# Patient Record
Sex: MALE | Employment: UNEMPLOYED | URBAN - METROPOLITAN AREA
[De-identification: names, ages, dates, MRNs, and addresses within clinical notes are randomized per-mention and may not be internally consistent; named-entity substitution may affect disease eponyms.]

---

## 2017-04-26 ENCOUNTER — ANESTHESIA (OUTPATIENT)
Dept: SURGERY | Facility: CLINIC | Age: 27
End: 2017-04-26
Payer: MEDICAID

## 2017-04-26 ENCOUNTER — HOSPITAL ENCOUNTER (OUTPATIENT)
Facility: CLINIC | Age: 27
Discharge: HOME | End: 2017-04-26
Attending: DENTIST | Admitting: DENTIST
Payer: MEDICAID

## 2017-04-26 ENCOUNTER — ANESTHESIA EVENT (OUTPATIENT)
Dept: SURGERY | Facility: CLINIC | Age: 27
End: 2017-04-26
Payer: MEDICAID

## 2017-04-26 VITALS
WEIGHT: 161.16 LBS | DIASTOLIC BLOOD PRESSURE: 58 MMHG | TEMPERATURE: 97.8 F | RESPIRATION RATE: 20 BRPM | SYSTOLIC BLOOD PRESSURE: 96 MMHG | OXYGEN SATURATION: 95 % | BODY MASS INDEX: 31.64 KG/M2 | HEIGHT: 60 IN

## 2017-04-26 PROCEDURE — 25000125 ZZHC RX 250: Performed by: DENTIST

## 2017-04-26 PROCEDURE — 40000170 ZZH STATISTIC PRE-PROCEDURE ASSESSMENT II: Performed by: DENTIST

## 2017-04-26 PROCEDURE — 27210794 ZZH OR GENERAL SUPPLY STERILE: Performed by: DENTIST

## 2017-04-26 PROCEDURE — 36000051 ZZH SURGERY LEVEL 2 1ST 30 MIN - UMMC: Performed by: DENTIST

## 2017-04-26 PROCEDURE — 25000128 H RX IP 250 OP 636: Performed by: NURSE ANESTHETIST, CERTIFIED REGISTERED

## 2017-04-26 PROCEDURE — 36000053 ZZH SURGERY LEVEL 2 EA 15 ADDTL MIN - UMMC: Performed by: DENTIST

## 2017-04-26 PROCEDURE — 37000009 ZZH ANESTHESIA TECHNICAL FEE, EACH ADDTL 15 MIN: Performed by: DENTIST

## 2017-04-26 PROCEDURE — 25000566 ZZH SEVOFLURANE, EA 15 MIN: Performed by: DENTIST

## 2017-04-26 PROCEDURE — 25000125 ZZHC RX 250: Performed by: REGISTERED NURSE

## 2017-04-26 PROCEDURE — 71000027 ZZH RECOVERY PHASE 2 EACH 15 MINS: Performed by: DENTIST

## 2017-04-26 PROCEDURE — 25000128 H RX IP 250 OP 636: Performed by: REGISTERED NURSE

## 2017-04-26 PROCEDURE — 25800025 ZZH RX 258: Performed by: REGISTERED NURSE

## 2017-04-26 PROCEDURE — 25000125 ZZHC RX 250: Performed by: NURSE ANESTHETIST, CERTIFIED REGISTERED

## 2017-04-26 PROCEDURE — 37000008 ZZH ANESTHESIA TECHNICAL FEE, 1ST 30 MIN: Performed by: DENTIST

## 2017-04-26 PROCEDURE — 71000014 ZZH RECOVERY PHASE 1 LEVEL 2 FIRST HR: Performed by: DENTIST

## 2017-04-26 PROCEDURE — 25000132 ZZH RX MED GY IP 250 OP 250 PS 637: Performed by: REGISTERED NURSE

## 2017-04-26 PROCEDURE — 25000132 ZZH RX MED GY IP 250 OP 250 PS 637: Performed by: DENTIST

## 2017-04-26 RX ORDER — MEPERIDINE HYDROCHLORIDE 25 MG/ML
12.5 INJECTION INTRAMUSCULAR; INTRAVENOUS; SUBCUTANEOUS
Status: DISCONTINUED | OUTPATIENT
Start: 2017-04-26 | End: 2017-04-26 | Stop reason: HOSPADM

## 2017-04-26 RX ORDER — DEXAMETHASONE SODIUM PHOSPHATE 4 MG/ML
INJECTION, SOLUTION INTRA-ARTICULAR; INTRALESIONAL; INTRAMUSCULAR; INTRAVENOUS; SOFT TISSUE PRN
Status: DISCONTINUED | OUTPATIENT
Start: 2017-04-26 | End: 2017-04-26

## 2017-04-26 RX ORDER — NALOXONE HYDROCHLORIDE 0.4 MG/ML
.1-.4 INJECTION, SOLUTION INTRAMUSCULAR; INTRAVENOUS; SUBCUTANEOUS
Status: DISCONTINUED | OUTPATIENT
Start: 2017-04-26 | End: 2017-04-26 | Stop reason: HOSPADM

## 2017-04-26 RX ORDER — CEFAZOLIN SODIUM 1 G/3ML
1 INJECTION, POWDER, FOR SOLUTION INTRAMUSCULAR; INTRAVENOUS SEE ADMIN INSTRUCTIONS
Status: DISCONTINUED | OUTPATIENT
Start: 2017-04-26 | End: 2017-04-26 | Stop reason: HOSPADM

## 2017-04-26 RX ORDER — FENTANYL CITRATE 50 UG/ML
25-50 INJECTION, SOLUTION INTRAMUSCULAR; INTRAVENOUS
Status: DISCONTINUED | OUTPATIENT
Start: 2017-04-26 | End: 2017-04-26 | Stop reason: HOSPADM

## 2017-04-26 RX ORDER — CEFAZOLIN SODIUM 2 G/100ML
2 INJECTION, SOLUTION INTRAVENOUS
Status: DISCONTINUED | OUTPATIENT
Start: 2017-04-26 | End: 2017-04-26 | Stop reason: HOSPADM

## 2017-04-26 RX ORDER — SODIUM CHLORIDE, SODIUM LACTATE, POTASSIUM CHLORIDE, CALCIUM CHLORIDE 600; 310; 30; 20 MG/100ML; MG/100ML; MG/100ML; MG/100ML
INJECTION, SOLUTION INTRAVENOUS CONTINUOUS
Status: DISCONTINUED | OUTPATIENT
Start: 2017-04-26 | End: 2017-04-26 | Stop reason: HOSPADM

## 2017-04-26 RX ORDER — ONDANSETRON 2 MG/ML
4 INJECTION INTRAMUSCULAR; INTRAVENOUS EVERY 30 MIN PRN
Status: DISCONTINUED | OUTPATIENT
Start: 2017-04-26 | End: 2017-04-26 | Stop reason: HOSPADM

## 2017-04-26 RX ORDER — ONDANSETRON 4 MG/1
4 TABLET, ORALLY DISINTEGRATING ORAL EVERY 30 MIN PRN
Status: DISCONTINUED | OUTPATIENT
Start: 2017-04-26 | End: 2017-04-26 | Stop reason: HOSPADM

## 2017-04-26 RX ORDER — NEOSTIGMINE METHYLSULFATE 1 MG/ML
VIAL (ML) INJECTION PRN
Status: DISCONTINUED | OUTPATIENT
Start: 2017-04-26 | End: 2017-04-26

## 2017-04-26 RX ORDER — CHLORHEXIDINE GLUCONATE ORAL RINSE 1.2 MG/ML
10 SOLUTION DENTAL ONCE
Status: DISCONTINUED | OUTPATIENT
Start: 2017-04-26 | End: 2017-04-26 | Stop reason: HOSPADM

## 2017-04-26 RX ORDER — ONDANSETRON 2 MG/ML
INJECTION INTRAMUSCULAR; INTRAVENOUS PRN
Status: DISCONTINUED | OUTPATIENT
Start: 2017-04-26 | End: 2017-04-26

## 2017-04-26 RX ORDER — PROPOFOL 10 MG/ML
INJECTION, EMULSION INTRAVENOUS PRN
Status: DISCONTINUED | OUTPATIENT
Start: 2017-04-26 | End: 2017-04-26

## 2017-04-26 RX ORDER — BACITRACIN ZINC 500 [USP'U]/G
OINTMENT TOPICAL PRN
Status: DISCONTINUED | OUTPATIENT
Start: 2017-04-26 | End: 2017-04-26 | Stop reason: HOSPADM

## 2017-04-26 RX ORDER — SODIUM CHLORIDE, SODIUM LACTATE, POTASSIUM CHLORIDE, CALCIUM CHLORIDE 600; 310; 30; 20 MG/100ML; MG/100ML; MG/100ML; MG/100ML
INJECTION, SOLUTION INTRAVENOUS CONTINUOUS PRN
Status: DISCONTINUED | OUTPATIENT
Start: 2017-04-26 | End: 2017-04-26

## 2017-04-26 RX ORDER — FENTANYL CITRATE 50 UG/ML
INJECTION, SOLUTION INTRAMUSCULAR; INTRAVENOUS PRN
Status: DISCONTINUED | OUTPATIENT
Start: 2017-04-26 | End: 2017-04-26

## 2017-04-26 RX ORDER — KETOROLAC TROMETHAMINE 30 MG/ML
INJECTION, SOLUTION INTRAMUSCULAR; INTRAVENOUS PRN
Status: DISCONTINUED | OUTPATIENT
Start: 2017-04-26 | End: 2017-04-26

## 2017-04-26 RX ORDER — LIDOCAINE HYDROCHLORIDE 20 MG/ML
INJECTION, SOLUTION INFILTRATION; PERINEURAL PRN
Status: DISCONTINUED | OUTPATIENT
Start: 2017-04-26 | End: 2017-04-26

## 2017-04-26 RX ORDER — OXYMETAZOLINE HYDROCHLORIDE 0.05 G/100ML
SPRAY NASAL PRN
Status: DISCONTINUED | OUTPATIENT
Start: 2017-04-26 | End: 2017-04-26

## 2017-04-26 RX ORDER — CHLORHEXIDINE GLUCONATE ORAL RINSE 1.2 MG/ML
SOLUTION DENTAL PRN
Status: DISCONTINUED | OUTPATIENT
Start: 2017-04-26 | End: 2017-04-26 | Stop reason: HOSPADM

## 2017-04-26 RX ORDER — GLYCOPYRROLATE 0.2 MG/ML
INJECTION, SOLUTION INTRAMUSCULAR; INTRAVENOUS PRN
Status: DISCONTINUED | OUTPATIENT
Start: 2017-04-26 | End: 2017-04-26

## 2017-04-26 RX ADMIN — MIDAZOLAM HYDROCHLORIDE 1 MG: 1 INJECTION, SOLUTION INTRAMUSCULAR; INTRAVENOUS at 12:41

## 2017-04-26 RX ADMIN — PHENYLEPHRINE HYDROCHLORIDE 50 MCG: 10 INJECTION, SOLUTION INTRAMUSCULAR; INTRAVENOUS; SUBCUTANEOUS at 13:52

## 2017-04-26 RX ADMIN — DEXMEDETOMIDINE HYDROCHLORIDE 10 MCG: 100 INJECTION, SOLUTION INTRAVENOUS at 14:03

## 2017-04-26 RX ADMIN — Medication 2 SPRAY: at 12:48

## 2017-04-26 RX ADMIN — SODIUM CHLORIDE, POTASSIUM CHLORIDE, SODIUM LACTATE AND CALCIUM CHLORIDE: 600; 310; 30; 20 INJECTION, SOLUTION INTRAVENOUS at 14:30

## 2017-04-26 RX ADMIN — Medication 10 MG: at 13:35

## 2017-04-26 RX ADMIN — FENTANYL CITRATE 50 MCG: 50 INJECTION, SOLUTION INTRAMUSCULAR; INTRAVENOUS at 12:46

## 2017-04-26 RX ADMIN — DEXAMETHASONE SODIUM PHOSPHATE 8 MG: 4 INJECTION, SOLUTION INTRAMUSCULAR; INTRAVENOUS at 13:23

## 2017-04-26 RX ADMIN — NEOSTIGMINE METHYLSULFATE 2 MG: 1 INJECTION INTRAMUSCULAR; INTRAVENOUS; SUBCUTANEOUS at 14:04

## 2017-04-26 RX ADMIN — PROPOFOL 80 MG: 10 INJECTION, EMULSION INTRAVENOUS at 13:35

## 2017-04-26 RX ADMIN — LIDOCAINE HYDROCHLORIDE 60 MG: 20 INJECTION, SOLUTION INFILTRATION; PERINEURAL at 12:46

## 2017-04-26 RX ADMIN — GLYCOPYRROLATE 0.4 MG: 0.2 INJECTION, SOLUTION INTRAMUSCULAR; INTRAVENOUS at 14:04

## 2017-04-26 RX ADMIN — DEXMEDETOMIDINE HYDROCHLORIDE 10 MCG: 100 INJECTION, SOLUTION INTRAVENOUS at 14:11

## 2017-04-26 RX ADMIN — NEOSTIGMINE METHYLSULFATE 2 MG: 1 INJECTION INTRAMUSCULAR; INTRAVENOUS; SUBCUTANEOUS at 14:10

## 2017-04-26 RX ADMIN — PHENYLEPHRINE HYDROCHLORIDE 50 MCG: 10 INJECTION, SOLUTION INTRAMUSCULAR; INTRAVENOUS; SUBCUTANEOUS at 13:54

## 2017-04-26 RX ADMIN — PROPOFOL 120 MG: 10 INJECTION, EMULSION INTRAVENOUS at 12:46

## 2017-04-26 RX ADMIN — ONDANSETRON 4 MG: 2 INJECTION INTRAMUSCULAR; INTRAVENOUS at 14:10

## 2017-04-26 RX ADMIN — KETOROLAC TROMETHAMINE 30 MG: 30 INJECTION, SOLUTION INTRAMUSCULAR at 14:12

## 2017-04-26 RX ADMIN — SODIUM CHLORIDE, POTASSIUM CHLORIDE, SODIUM LACTATE AND CALCIUM CHLORIDE: 600; 310; 30; 20 INJECTION, SOLUTION INTRAVENOUS at 12:41

## 2017-04-26 RX ADMIN — FENTANYL CITRATE 50 MCG: 50 INJECTION, SOLUTION INTRAMUSCULAR; INTRAVENOUS at 13:35

## 2017-04-26 RX ADMIN — Medication 40 MG: at 12:46

## 2017-04-26 NOTE — ANESTHESIA CARE TRANSFER NOTE
Patient: William Mckeon    Procedure(s):  Dental Exam, Radiographs, Extractions x1, Periodontal Therapy, Flouride Varnish - Wound Class: II-Clean Contaminated    Diagnosis: Dental Caries, Periodontal Disease   Diagnosis Additional Information: No value filed.    Anesthesia Type:   General, ETT     Note:  Airway :Face Mask  Patient transferred to:PACU  Comments: Transfer to PACU for recovery.  Monitors placed.  VSS noted.  Report to RN.        Vitals: (Last set prior to Anesthesia Care Transfer)    CRNA VITALS  4/26/2017 1351 - 4/26/2017 1429      4/26/2017             Pulse: 100    Ht Rate: 100    SpO2: 97 %                Electronically Signed By: VALERIE HURST CRNA  April 26, 2017  2:29 PM

## 2017-04-26 NOTE — OR NURSING
Patient expressed that he needed to void. Taken by wheelchair to bathroom, voided in toilet. Patient remains very lethargic. Requiring frequent stimulation to keep eyes open. Oxygen saturations fluctuating 88-96%. Have kept patient up in wheelchair to promote resp efforts. Mother offering child fluids and applesauce, will continue to monitor. Reviewed with mother that he will not yolanda discharged until he stays awake and maintains oxygen saturations 92% or >. Mom verbalizing understanding of plan of care.

## 2017-04-26 NOTE — ANESTHESIA POSTPROCEDURE EVALUATION
Patient: William Mckeon    Procedure(s):  Dental Exam, Radiographs, Extractions x1, Periodontal Therapy, Flouride Varnish - Wound Class: II-Clean Contaminated    Diagnosis:Dental Caries, Periodontal Disease   Diagnosis Additional Information: No value filed.    Anesthesia Type:  General, ETT    Note:  Anesthesia Post Evaluation    Patient location during evaluation: PACU and Bedside  Patient participation: Able to fully participate in evaluation  Level of consciousness: awake and alert  Pain management: adequate  Airway patency: patent  Cardiovascular status: acceptable  Respiratory status: acceptable  Hydration status: balanced  PONV: none     Anesthetic complications: None          Last vitals:  Vitals:    04/26/17 1424 04/26/17 1430 04/26/17 1445   BP: 124/67 116/62 113/54   Resp: 16     Temp: 36.3  C (97.3  F)     SpO2: 100% 98% 97%         Electronically Signed By: Vandana Stewart MD  April 26, 2017  2:54 PM

## 2017-04-26 NOTE — DISCHARGE INSTRUCTIONS
Same-Day Surgery   Adult Discharge Orders & Instructions     For 24 hours after surgery:  1. Get plenty of rest.  A responsible adult must stay with you for at least 24 hours after you leave the hospital.   2. Pain medication can slow your reflexes. Do not drive or use heavy equipment.  If you have weakness or tingling, don't drive or use heavy equipment until this feeling goes away.  3. Mixing alcohol and pain medication can cause dizziness and slow your breathing. It can even be fatal. Do not drink alcohol while taking pain medication.  4. Avoid strenuous or risky activities.  Ask for help when climbing stairs.   5. You may feel lightheaded.  If so, sit for a few minutes before standing.  Have someone help you get up.   6. If you have nausea (feel sick to your stomach), drink only clear liquids such as apple juice, ginger ale, broth or 7-Up.  Rest may also help.  Be sure to drink enough fluids.  Move to a regular diet as you feel able. Take pain medications with a small amount of solid food, such as toast or crackers, to avoid nausea.   7. A slight fever is normal. Call the doctor if your fever is over 100 F (37.7 C) (taken under the tongue) or lasts longer than 24 hours.  8. You may have a dry mouth, muscle aches, trouble sleeping or a sore throat.  These symptoms should go away after 24 hours.  9. Do not make important or legal decisions.   Pain Management:      1. Take pain medication (if prescribed) for pain as directed by your physician.        2. WARNING: If the pain medication you have been prescribed contains Tylenol  (acetaminophen), DO NOT take additional doses of Tylenol (acetaminophen).     Call your doctor for any of the followin.  Signs of infection (fever, growing tenderness at the surgery site, severe pain, a large amount of drainage or bleeding, foul-smelling drainage, redness, swelling).    2.  It has been over 8 to 10 hours since surgery and you are still not able to urinate (pee).    3.   Headache for over 24 hours.    4.  Numbness, tingling or weakness the day after surgery (if you had spinal anesthesia).  To contact a doctor, call ___________DR Zazueta__________________________ or:      [phone] and ask for the Resident On Call for:          __________________Dentistry________________________ (answered 24 hours a day)      Emergency Department:  [facility]: [phone]  [place] [facility]: [phone]               Rev. 10/2014

## 2017-04-26 NOTE — IP AVS SNAPSHOT
MAIN OR    [address]    [place] [address]    Phone:  [phone]                                       After Visit Summary   4/26/2017    William Mckeon    MRN: [id number]           After Visit Summary Signature Page     I have received my discharge instructions, and my questions have been answered. I have discussed any challenges I see with this plan with the nurse or doctor.    ..........................................................................................................................................  Patient/Patient Representative Signature      ..........................................................................................................................................  Patient Representative Print Name and Relationship to Patient    ..................................................               ................................................  Date                                            Time    ..........................................................................................................................................  Reviewed by Signature/Title    ...................................................              ..............................................  Date                                                            Time

## 2017-04-26 NOTE — IP AVS SNAPSHOT
MRN:[id number]                      After Visit Summary   4/26/2017    William Mckeon    MRN: [id number]           Thank you!     Thank you for choosing [facility] for your care. Our goal is always to provide you with excellent care. Hearing back from our patients is one way we can continue to improve our services. Please take a few minutes to complete the written survey that you may receive in the mail after you visit with us. Thank you!        Patient Information     Date Of Birth          1990        About your hospital stay     You were admitted on:  April 26, 2017 You last received care in the:  [facility] OR    You were discharged on:  April 26, 2017       Who to Call     For medical emergencies, please call 911.  For non-urgent questions about your medical care, please call your primary care provider or clinic, [phone]  For questions related to your surgery, please call your surgery clinic        Attending Provider     Provider Uyen Rosas DDS Dentist       Primary Care Provider Office Phone # Fax #    Annetta Saavedra [phone]       [address] [address]        After Care Instructions     Discharge Instructions       Return to [facility] dental clinic in 6 months for recall and follow up.  Call the dental clinic to schedule the appointment.  Clinic phone: [phone]  Emergency post op care (after business hours and weekends) call: [phone], and ask for the dental resident on call.    Procedures Performed Today (April 26, 2017)  Dental exam, dental x-rays, cleaning, dental extraction of #T, fluoride varnish.     Post-operative oral surgery instructions  Care of the mouth following a surgical procedure is essential in the healing process.  There is a certain amount of swelling, discoloration, discomfort and bleeding which can be expected.     Swelling:  Some degree of swelling is normal and can be  minimized with the use of ice or cold packs applied to the area for 15-20 minutes and then removed for 15-20 minutes.  This should only be done for the first 24 hours, after 24 hours use a warm moist compress over the area for 15-20 minutes and then remove for 15-20 minutes.  Sit upright and keep your head elevated when sleeping.  Maximum swelling will occur about the second or third post-operative day and then slowly recede. Once present, it can remain swollen for up to 7 days and discomfort may persist for 10 days.    Discomfort:   A variable amount of pain follows extraction and oral surgery procedures. Tylenol, ibuprofen, or any over the counter pain medication can be used. In some cases, prescription pain medication will be given which  should be taken exactly as directed, and taken before the local anesthetic wears off. If pain increases for more than 3 days call the clinic.   Do not take pain medication on an empty stomach as it may cause nausea.     Bleeding:  Some bleeding and oozing is to be expected for several hours.  Avoid spitting, rinsing, swishing, and use of a straw for the next 24-48 hours, as they may provoke oozing.  If bleeding is visible then place a moistened gauze pack over the area and keep firm pressure on the gauze pack for 30 minutes and then discard.  If bleeding is more than slight, use sterile gauze or a moistened tea bag over the area and again apply firm pressure for 30 minutes.     Discoloration:   Facial discoloration (black and blue bruising) often follows many extractions and oral surgery procedures. Discoloration is normal and is no cause for alarm. It may persist for as long as several weeks.    Jaw Stiffness:   For several days following most oral surgical procedures, the jaw may become somewhat stiff. Should jaw stiffness worsen after 2  weeks, call the clinic.    Numbness:    Local anesthetics may be effective for approximately 24-48 hours. If you are numb beyond this time  frame, please call the clinic.    Nausea:    Nausea is common after surgery, and it is sometimes caused by pain medicines. Nausea may be reduced by preceding each pill with a small amount of soft food, then taking the pill with a large volume of water. Continue consuming clear fluids and minimize the pain medication. Call if you don t feel better or if vomiting is a problem. Soft drinks that have less carbonation may help with nausea.    Care of the mouth:  Do not rinse your mouth for 24 hours after surgery.  A day following surgery or extraction rinse with warm salt water after each meal or 3-4 times a day (One half teaspoonful of table salt in a full glass of warm water). Resume normal oral hygiene within 24 hours after procedure. Do not brush surgical or extraction site but clean your teeth within the bounds of comfort.   Avoid use of alcohol, smoking, or carbonated drinks for 24-48 hours after surgery.  This may interfere with clot formation and slow the healing process.  Smoking is the primary cause of dry sockets.    Dry Socket:   This is a condition where the wound healing is disturbed or altered after removal of a tooth. This usually occurs within 3-10 days after removal of a tooth. Typically pain will increase and worsen, and may radiate along the jaw and into the ear. Call the clinic if you experience these symptoms.    Diet:  A soft or liquid diet is recommended for the first few days following surgery, advance as tolerated. Until local anesthesia (numbness) wears off, be careful chewing to prevent biting the numb area. Eat soft and liquid foods such as yogurt, cottage cheese, soup etc. Try not to skip a meal, and keep up normal diet.    Rest:  Rest as much as possible for the next 24 hours, avoiding any excessive amount of physical activity.     Fluoride Varnish:  A 5% sodium fluoride varnish was placed over the teeth for the prevention of dental decay.  The varnish hardens on contact with saliva so the  teeth may appear spotty or as if there is a thin film coating the teeth, this is normal.  The varnish should remain on your teeth for at least 4-6 hours for the maximum effect.  It is recommended that you only eat soft foods and drink cold liquids during this time, do not eat or drink anything hot and do not brush your teeth the day of your surgery.  The varnish with naturally wear away, and can be brushed off the next day.     William may return to regular daily activities in 48-72 hours.                  Further instructions from your care team       Same-Day Surgery   Adult Discharge Orders & Instructions     For 24 hours after surgery:  1. Get plenty of rest.  A responsible adult must stay with you for at least 24 hours after you leave the hospital.   2. Pain medication can slow your reflexes. Do not drive or use heavy equipment.  If you have weakness or tingling, don't drive or use heavy equipment until this feeling goes away.  3. Mixing alcohol and pain medication can cause dizziness and slow your breathing. It can even be fatal. Do not drink alcohol while taking pain medication.  4. Avoid strenuous or risky activities.  Ask for help when climbing stairs.   5. You may feel lightheaded.  If so, sit for a few minutes before standing.  Have someone help you get up.   6. If you have nausea (feel sick to your stomach), drink only clear liquids such as apple juice, ginger ale, broth or 7-Up.  Rest may also help.  Be sure to drink enough fluids.  Move to a regular diet as you feel able. Take pain medications with a small amount of solid food, such as toast or crackers, to avoid nausea.   7. A slight fever is normal. Call the doctor if your fever is over 100 F (37.7 C) (taken under the tongue) or lasts longer than 24 hours.  8. You may have a dry mouth, muscle aches, trouble sleeping or a sore throat.  These symptoms should go away after 24 hours.  9. Do not make important or legal decisions.   Pain Management:      1.  "Take pain medication (if prescribed) for pain as directed by your physician.        2. WARNING: If the pain medication you have been prescribed contains Tylenol  (acetaminophen), DO NOT take additional doses of Tylenol (acetaminophen).     Call your doctor for any of the followin.  Signs of infection (fever, growing tenderness at the surgery site, severe pain, a large amount of drainage or bleeding, foul-smelling drainage, redness, swelling).    2.  It has been over 8 to 10 hours since surgery and you are still not able to urinate (pee).    3.  Headache for over 24 hours.    4.  Numbness, tingling or weakness the day after surgery (if you had spinal anesthesia).  To contact a doctor, call ___________DR Zazueta__________________________ or:      [phone] and ask for the Resident On Call for:          __________________Dentistry________________________ (answered 24 hours a day)      Emergency Department:  [place]: [phone]  [facility] [facility]: [phone]               Rev. 10/2014       Pending Results     No orders found from 2017 to 2017.            Admission Information     Date & Time Provider Department Dept. Phone    2017 Uyen Zazueta DDS [facility] MAIN [phone]      Your Vitals Were     Blood Pressure Temperature Respirations Height Weight Pulse Oximetry    117/87 97.9  F (36.6  C) (Oral) 16 1.448 m (4' 9\") 73.1 kg (161 lb 2.5 oz) 98%    BMI (Body Mass Index)                   34.87 kg/m2           amprice Information     amprice lets you send messages to your doctor, view your test results, renew your prescriptions, schedule appointments and more. To sign up, go to [email or web address] . Click on \"Log in\" on the left side of the screen, which will take you to the Welcome page. Then click on \"Sign up Now\" on the right side of the page.     You will be asked to enter the access code listed below, as well as some personal " information. Please follow the directions to create your username and password.     Your access code is: [email or web address]  Expires: 2017  2:17 PM     Your access code will  in 90 days. If you need help or a new code, please call your [place] clinic or [phone].        Care EveryWhere ID     This is your Care EveryWhere ID. This could be used by other organizations to access your [place] medical records  [id number]           Review of your medicines      CONTINUE these medicines which have NOT CHANGED        Dose / Directions    etanercept 50 MG/ML injection   Commonly known as:  ENBREL        Refills:  0       UNABLE TO FIND        MEDICATION NAME: vitamin   Refills:  0                Protect others around you: Learn how to safely use, store and throw away your medicines at www.disposemymeds.org.             Medication List: This is a list of all your medications and when to take them. Check marks below indicate your daily home schedule. Keep this list as a reference.      Medications           Morning Afternoon Evening Bedtime As Needed    etanercept 50 MG/ML injection   Commonly known as:  ENBREL                                UNABLE TO FIND   MEDICATION NAME: vitamin

## 2017-04-26 NOTE — ANESTHESIA PREPROCEDURE EVALUATION
Anesthesia Plan      History & Physical Review  History and physical reviewed and following examination; no interval change.    ASA Status:  3 .    NPO Status:  > 8 hours    Plan for General and ETT with Intravenous and Propofol induction. Maintenance will be Inhalation.    PONV prophylaxis:  Ondansetron (or other 5HT-3)  Additional equipment: Videolaryngoscope      Postoperative Care  Postoperative pain management:  IV analgesics and Multi-modal analgesia.      Consents  Anesthetic plan, risks, benefits and alternatives discussed with:  Parent (Mother and/or Father)..                          .

## 2017-04-26 NOTE — BRIEF OP NOTE
[facility], [place]    Brief Operative Note    Pre-operative diagnosis: Dental Caries, Periodontal Disease   Post-operative diagnosis #T extensive decay, abscess, gingivitis  Procedure: Procedure(s):  Dental Exam, Radiographs, Extractions x1, Periodontal Therapy, Flouride Varnish - Wound Class: II-Clean Contaminated  Surgeon: Surgeon(s) and Role:     * Uyen Zazueta DDS - Primary     * Neelam Dunham DDS - Resident - Assisting  Anesthesia: General   Estimated blood loss: Less than 10 ml  Drains: None  Specimens: * No specimens in log *  Findings:   None.  Complications: None.  Implants: None.    Pt was extubated in OR.

## 2017-04-26 NOTE — OR NURSING
Report given, care transferred at bedside to Natalie Marshall RN. Plan of care reviewed with mother.

## 2017-04-26 NOTE — OR NURSING
Remains very difficult to keep awake. Placed back on oxygen, 3L for vepifylxonm45-35% on room air. Tolerated fluids.  Will continue to monitor until fully awake and able to discharge.

## 2017-04-26 NOTE — OR NURSING
Report received, care transferred to this RN at bedside from Neelam Lees RN. Patient is sitting up in bed, mother at bedside. Taking sips of fluids, swallowing well, no bleeding noted.

## 2017-04-27 NOTE — OP NOTE
DATE OF SURGERY:  04/26/2017.      It was deemed necessary for this patient to be seen in the hospital operating room due to Down syndrome and inability to be treated in a traditional dental clinic setting.      PROCEDURES:  Under general anesthesia, the following operations were performed in the mouth:   1.  Bilateral dental examination.   2.  Dental radiographs.   3.  Prophylaxis and periodontal therapy.   4.  Dental extractions x1.   5.  Fluoride varnish application.      ATTENDING PHYSICIAN:  Uyen Zazueta DDS      FIRST ASSISTANT DENTAL RESIDENT:  Neelam Dunham DMD      ANESTHESIOLOGIST:  Franklin MD      SCRUB NURSE:  Siomara Montez.      CIRCULATING NURSE:  Celeste Morton.       CRNA:  Siomara Hanley CRNA      PREOPERATIVE DIAGNOSES:  Suspected periodontal disease and dental caries.      POSTOPERATIVE DIAGNOSES:   1.  Gingival hyperplasia.   2.  Chronic generalized gingivitis and early periodontitis.   3.  Enamel hypoplasia   4.  Dental caries.   5.  One nonrestorable tooth.      DESCRIPTION OF PROCEDURE:  William Mckeon was brought into the operating room and draped in the usual customary [facility] fashion.  Following the timeout procedures, general anesthesia was administered through the patient's right naris.  A bilateral dental exam was performed and a series of 1 periapical and 4 bitewing radiographs were obtained and interpreted.  A moist throat pack was placed at 13:35.  Clinical examination revealed 1 grossly decayed tooth, generalized heavy plaque, periodontal pockets ranging from 3-5 mm, and generalized xerostomia.  Radiographic examination revealed normal bone trabeculation, missing tooth numbers 1, 4, 16, 17, 20, 29 and 32.  One coronal radiolucency consistent with dental caries on tooth number T and periapical radiolucency under tooth number T as well as retained primary teeth numbers A and T.  The local anesthetic used was 2% lidocaine with  1:100,000 epinephrine.  The total amount dispersed was 1 mL.  The following procedures were performed:  Periodontal therapy was performed on all teeth using ultrasonic debridement, supragingival scaling and root planing, rubber cup polishing and flossing.  Nonsurgical extractions were performed on the mandibular right primary second molar, number T.  Gauze hemostasis was achieved.  Fluoride varnish was applied to all teeth.  The throat pack was then removed with suction at 14:05.  The oropharynx was inspected and found to be clear.  Estimated blood loss was 1 mL.      The attending doctor, Dr. Zazueta, was present for the entire procedure.  The patient was extubated in the operating room and taken to the postanesthesia care unit in good condition.         ELKIN ZAZUETA DDS       As dictated by LEVI GORE DMD            D: 2017 15:14   T: 2017 06:10   MT: cedric      Name:     KARLEY VALENTIN   MRN:      [id number]        Account:        [id number]   :      1990           Procedure Date: 2017      Document: [id number]

## 2021-10-13 DIAGNOSIS — Z11.59 ENCOUNTER FOR SCREENING FOR OTHER VIRAL DISEASES: ICD-10-CM

## 2021-11-01 RX ORDER — BETAMETHASONE DIPROPIONATE 0.5 MG/ML
LOTION, AUGMENTED TOPICAL
COMMUNITY
Start: 2021-04-15

## 2021-11-01 RX ORDER — ERGOCALCIFEROL 1.25 MG/1
50000 CAPSULE ORAL
COMMUNITY
Start: 2021-06-07

## 2021-11-01 RX ORDER — CLOBETASOL PROPIONATE 0.5 MG/ML
SOLUTION TOPICAL
COMMUNITY
Start: 2021-04-15

## 2021-11-01 RX ORDER — CLINDAMYCIN PHOSPHATE 10 MG/ML
1 SOLUTION TOPICAL
COMMUNITY
Start: 2021-05-19

## 2021-11-01 NOTE — OR NURSING
Secure email sent to [employer] & PreOp manager team:    From: Camila Townsend   Sent: Monday, November 1, 2021 5:12 PM  To: Shanique Moreno <[email or web address]>; Pamela Gonsalez <[email or web address]>; Suly Ruiz <[email or web address]>  Cc: Siomara Haskins <[email or web address]>  Subject: Confidential: Guardianship & Accommodations  Importance: High    Dear [employer] & PreOp manager team,    I just completed the PreOp phone call for pt William Mckeon MR#[id number], of note: Mom is Guardian - paperwork is on file under Chart Review > Media tab in EPIC, however there is not the usual flag in the chart regarding consent nor any listing under the Code/ACD tab. Shanique Memento, would you please review to see if anything additional needs to be placed in the chart? I did put in a brief note in demographics regarding contact/caretaking.    Additionally, PreOp team - pt is non-verbal, nervous about procedures. Mom would appreciate escort to PreOp and soft music for patient to help keep him calm/reduce anxiety. Mom Zulma is his caretaker and would like to be with him as much as possible pre- & post-op.     Thank you for taking the time to review,    Pearl Townsend, RN, BSN, MPH  PreAdmission Nursing  [facility]

## 2021-11-02 ENCOUNTER — DOCUMENTATION ONLY (OUTPATIENT)
Dept: OTHER | Facility: CLINIC | Age: 31
End: 2021-11-02

## 2021-11-02 ENCOUNTER — ANESTHESIA EVENT (OUTPATIENT)
Dept: SURGERY | Facility: CLINIC | Age: 31
End: 2021-11-02
Payer: MEDICARE

## 2021-11-03 ENCOUNTER — HOSPITAL ENCOUNTER (OUTPATIENT)
Facility: CLINIC | Age: 31
Discharge: HOME | End: 2021-11-03
Attending: DENTIST | Admitting: DENTIST
Payer: MEDICARE

## 2021-11-03 ENCOUNTER — ANESTHESIA (OUTPATIENT)
Dept: SURGERY | Facility: CLINIC | Age: 31
End: 2021-11-03
Payer: MEDICARE

## 2021-11-03 VITALS
SYSTOLIC BLOOD PRESSURE: 99 MMHG | RESPIRATION RATE: 16 BRPM | OXYGEN SATURATION: 96 % | HEART RATE: 78 BPM | TEMPERATURE: 97.5 F | HEIGHT: 60 IN | BODY MASS INDEX: 32.68 KG/M2 | WEIGHT: 166.45 LBS | DIASTOLIC BLOOD PRESSURE: 63 MMHG

## 2021-11-03 LAB — GLUCOSE BLDC GLUCOMTR-MCNC: 93 MG/DL (ref 70–99)

## 2021-11-03 PROCEDURE — 250N000009 HC RX 250: Performed by: NURSE ANESTHETIST, CERTIFIED REGISTERED

## 2021-11-03 PROCEDURE — 250N000013 HC RX MED GY IP 250 OP 250 PS 637: Performed by: ANESTHESIOLOGY

## 2021-11-03 PROCEDURE — 82962 GLUCOSE BLOOD TEST: CPT | Mod: GZ

## 2021-11-03 PROCEDURE — 710N000012 HC RECOVERY PHASE 2, PER MINUTE: Performed by: DENTIST

## 2021-11-03 PROCEDURE — 258N000003 HC RX IP 258 OP 636: Performed by: NURSE ANESTHETIST, CERTIFIED REGISTERED

## 2021-11-03 PROCEDURE — 250N000013 HC RX MED GY IP 250 OP 250 PS 637: Performed by: DENTIST

## 2021-11-03 PROCEDURE — 250N000025 HC SEVOFLURANE, PER MIN: Performed by: DENTIST

## 2021-11-03 PROCEDURE — 710N000010 HC RECOVERY PHASE 1, LEVEL 2, PER MIN: Performed by: DENTIST

## 2021-11-03 PROCEDURE — 250N000011 HC RX IP 250 OP 636: Performed by: NURSE ANESTHETIST, CERTIFIED REGISTERED

## 2021-11-03 PROCEDURE — 999N000141 HC STATISTIC PRE-PROCEDURE NURSING ASSESSMENT: Performed by: DENTIST

## 2021-11-03 PROCEDURE — 360N000075 HC SURGERY LEVEL 2, PER MIN: Performed by: DENTIST

## 2021-11-03 PROCEDURE — 370N000017 HC ANESTHESIA TECHNICAL FEE, PER MIN: Performed by: DENTIST

## 2021-11-03 RX ORDER — ACETAMINOPHEN 325 MG/1
975 TABLET ORAL ONCE
Status: COMPLETED | OUTPATIENT
Start: 2021-11-03 | End: 2021-11-03

## 2021-11-03 RX ORDER — SODIUM CHLORIDE, SODIUM LACTATE, POTASSIUM CHLORIDE, CALCIUM CHLORIDE 600; 310; 30; 20 MG/100ML; MG/100ML; MG/100ML; MG/100ML
INJECTION, SOLUTION INTRAVENOUS CONTINUOUS
Status: DISCONTINUED | OUTPATIENT
Start: 2021-11-03 | End: 2021-11-03 | Stop reason: HOSPADM

## 2021-11-03 RX ORDER — PROPOFOL 10 MG/ML
INJECTION, EMULSION INTRAVENOUS PRN
Status: DISCONTINUED | OUTPATIENT
Start: 2021-11-03 | End: 2021-11-03

## 2021-11-03 RX ORDER — ONDANSETRON 2 MG/ML
INJECTION INTRAMUSCULAR; INTRAVENOUS PRN
Status: DISCONTINUED | OUTPATIENT
Start: 2021-11-03 | End: 2021-11-03

## 2021-11-03 RX ORDER — LIDOCAINE HYDROCHLORIDE 20 MG/ML
INJECTION, SOLUTION INFILTRATION; PERINEURAL PRN
Status: DISCONTINUED | OUTPATIENT
Start: 2021-11-03 | End: 2021-11-03

## 2021-11-03 RX ORDER — MEPERIDINE HYDROCHLORIDE 25 MG/ML
12.5 INJECTION INTRAMUSCULAR; INTRAVENOUS; SUBCUTANEOUS
Status: DISCONTINUED | OUTPATIENT
Start: 2021-11-03 | End: 2021-11-03 | Stop reason: HOSPADM

## 2021-11-03 RX ORDER — FENTANYL CITRATE 50 UG/ML
INJECTION, SOLUTION INTRAMUSCULAR; INTRAVENOUS PRN
Status: DISCONTINUED | OUTPATIENT
Start: 2021-11-03 | End: 2021-11-03

## 2021-11-03 RX ORDER — OXYCODONE HYDROCHLORIDE 5 MG/1
5 TABLET ORAL EVERY 4 HOURS PRN
Status: DISCONTINUED | OUTPATIENT
Start: 2021-11-03 | End: 2021-11-03 | Stop reason: HOSPADM

## 2021-11-03 RX ORDER — DEXAMETHASONE SODIUM PHOSPHATE 4 MG/ML
INJECTION, SOLUTION INTRA-ARTICULAR; INTRALESIONAL; INTRAMUSCULAR; INTRAVENOUS; SOFT TISSUE PRN
Status: DISCONTINUED | OUTPATIENT
Start: 2021-11-03 | End: 2021-11-03

## 2021-11-03 RX ORDER — ONDANSETRON 2 MG/ML
4 INJECTION INTRAMUSCULAR; INTRAVENOUS EVERY 30 MIN PRN
Status: DISCONTINUED | OUTPATIENT
Start: 2021-11-03 | End: 2021-11-03 | Stop reason: HOSPADM

## 2021-11-03 RX ORDER — FENTANYL CITRATE 50 UG/ML
25 INJECTION, SOLUTION INTRAMUSCULAR; INTRAVENOUS EVERY 5 MIN PRN
Status: DISCONTINUED | OUTPATIENT
Start: 2021-11-03 | End: 2021-11-03 | Stop reason: HOSPADM

## 2021-11-03 RX ORDER — FENTANYL CITRATE 50 UG/ML
25 INJECTION, SOLUTION INTRAMUSCULAR; INTRAVENOUS
Status: DISCONTINUED | OUTPATIENT
Start: 2021-11-03 | End: 2021-11-03 | Stop reason: HOSPADM

## 2021-11-03 RX ORDER — CHLORHEXIDINE GLUCONATE ORAL RINSE 1.2 MG/ML
SOLUTION DENTAL PRN
Status: DISCONTINUED | OUTPATIENT
Start: 2021-11-03 | End: 2021-11-03 | Stop reason: HOSPADM

## 2021-11-03 RX ORDER — ONDANSETRON 4 MG/1
4 TABLET, ORALLY DISINTEGRATING ORAL EVERY 30 MIN PRN
Status: DISCONTINUED | OUTPATIENT
Start: 2021-11-03 | End: 2021-11-03 | Stop reason: HOSPADM

## 2021-11-03 RX ORDER — SODIUM CHLORIDE, SODIUM LACTATE, POTASSIUM CHLORIDE, CALCIUM CHLORIDE 600; 310; 30; 20 MG/100ML; MG/100ML; MG/100ML; MG/100ML
INJECTION, SOLUTION INTRAVENOUS CONTINUOUS PRN
Status: DISCONTINUED | OUTPATIENT
Start: 2021-11-03 | End: 2021-11-03

## 2021-11-03 RX ADMIN — PROPOFOL 140 MG: 10 INJECTION, EMULSION INTRAVENOUS at 09:47

## 2021-11-03 RX ADMIN — FENTANYL CITRATE 75 MCG: 50 INJECTION, SOLUTION INTRAMUSCULAR; INTRAVENOUS at 09:47

## 2021-11-03 RX ADMIN — ONDANSETRON 4 MG: 2 INJECTION INTRAMUSCULAR; INTRAVENOUS at 10:52

## 2021-11-03 RX ADMIN — SUGAMMADEX 150 MG: 100 INJECTION, SOLUTION INTRAVENOUS at 10:53

## 2021-11-03 RX ADMIN — ROCURONIUM BROMIDE 50 MG: 50 INJECTION, SOLUTION INTRAVENOUS at 09:47

## 2021-11-03 RX ADMIN — DEXAMETHASONE SODIUM PHOSPHATE 8 MG: 4 INJECTION, SOLUTION INTRAMUSCULAR; INTRAVENOUS at 10:05

## 2021-11-03 RX ADMIN — SODIUM CHLORIDE, POTASSIUM CHLORIDE, SODIUM LACTATE AND CALCIUM CHLORIDE: 600; 310; 30; 20 INJECTION, SOLUTION INTRAVENOUS at 09:39

## 2021-11-03 RX ADMIN — LIDOCAINE HYDROCHLORIDE 70 MG: 20 INJECTION, SOLUTION INFILTRATION; PERINEURAL at 09:47

## 2021-11-03 RX ADMIN — ACETAMINOPHEN 975 MG: 325 TABLET, FILM COATED ORAL at 12:48

## 2021-11-03 ASSESSMENT — MIFFLIN-ST. JEOR: SCORE: 1500

## 2021-11-03 NOTE — ANESTHESIA PREPROCEDURE EVALUATION
Anesthesia Pre-Procedure Evaluation    Patient: William Mckeon   MRN: [id number] : 1990        Preoperative Diagnosis: Dental caries [K02.9]    Procedure : Procedure(s):  Bilateral dental exam, Dental radiograph, dental restorations, pulpotomy, root canal therapy, frenectomy, gingivectomy, Alveoloplasty, periodontal therapy, fluoride, varnish in the mouth, dental extractions          Past Medical History:   Diagnosis Date     Dental caries      Down's syndrome      Narcolepsy      Psoriasis      Sleep apnea       Past Surgical History:   Procedure Laterality Date     EXAM UNDER ANESTHESIA, RESTORATIONS, EXTRACTION(S) DENTAL COMPLEX, COMBINED  2012    Procedure: COMBINED EXAM UNDER ANESTHESIA, RESTORATIONS, EXTRACTION(S) DENTAL COMPLEX;  Bilateral Dental Exam, Radiograph, Dental Restorations, Periodontal Therapy, Dental Extractions, Biopsy;  Surgeon: Jordy Cordero DDS;  Location: [facility] OR     EXAM UNDER ANESTHESIA, RESTORATIONS, EXTRACTION(S) DENTAL COMPLEX, COMBINED N/A 2017    Procedure: COMBINED EXAM UNDER ANESTHESIA, RESTORATIONS, EXTRACTION(S) DENTAL COMPLEX;  Dental Exam, Radiographs, Extractions x1, Periodontal Therapy, Flouride Varnish;  Surgeon: Uyen Zazueta DDS;  Location: [facility] OR     MRI ANGIOGRAM LOWER EXTREMITY BILATERAL WITHOUT        Allergies   Allergen Reactions     Amoxicillin      Facial swelling and hives     Covid-19 (Mrna) Vaccine Rash     Developed an exanthematous rash the day after first shot - large welts all over face & body. Did not qualify for 2nd shot.      Social History     Tobacco Use     Smoking status: Never Smoker   Substance Use Topics     Alcohol use: No      Wt Readings from Last 1 Encounters:   17 73.1 kg (161 lb 2.5 oz)        Anesthesia Evaluation   Pt has had prior anesthetic. Type: General.    No history of anesthetic complications       ROS/MED HX  ENT/Pulmonary:     (+) sleep apnea, uses CPAP,     Neurologic:     (+)  "Developmental delay, level of function: Trisomy 21,     Cardiovascular: Comment: Sees a cardiologist for an \"extra vein.\" Due for a repeat ECHO that was pushed off due to Covid.    Does Sherley videos without issues.      METS/Exercise Tolerance:     Hematologic:       Musculoskeletal: Comment: Psoriatic Arthritis -receiving monthly biologic injections      GI/Hepatic:       Renal/Genitourinary:       Endo:     (+) Obesity,     Psychiatric/Substance Use:       Infectious Disease:       Malignancy:       Other:            Physical Exam    Airway  airway exam normal           Respiratory Devices and Support         Dental           Cardiovascular   cardiovascular exam normal          Pulmonary   pulmonary exam normal                OUTSIDE LABS:  CBC:   Lab Results   Component Value Date    WBC 6.1 06/27/2012    WBC 5.9 01/25/2007    HGB 14.5 06/27/2012    HGB 15.9 01/25/2007    HCT 42.7 06/27/2012    HCT 46.6 01/25/2007     06/27/2012     01/25/2007     BMP:   Lab Results   Component Value Date     01/25/2007    POTASSIUM 4.2 06/27/2012    POTASSIUM 3.8 01/25/2007    CHLORIDE 103 01/25/2007    CO2 30 01/25/2007    BUN 17 01/25/2007    CR 1.06 01/25/2007    GLC 87 01/25/2007     COAGS:   Lab Results   Component Value Date    PTT 33 06/27/2012    INR 1.02 06/27/2012     POC: No results found for: BGM, HCG, HCGS  HEPATIC:   Lab Results   Component Value Date    ALBUMIN 4.3 01/25/2007    PROTTOTAL 8.0 01/25/2007    ALT 31 01/25/2007    AST 31 01/25/2007    ALKPHOS 116 01/25/2007    BILITOTAL 0.2 01/25/2007     OTHER:   Lab Results   Component Value Date    TREMAINE 9.0 01/25/2007    TSH 1.66 01/25/2007       Anesthesia Plan    ASA Status:  3   NPO Status:  NPO Appropriate    Anesthesia Type: General.     - Airway: ETT   Induction: Intravenous.   Maintenance: Balanced.   Techniques and Equipment:     - Airway: Video-Laryngoscope         Consents    Anesthesia Plan(s) and associated risks, benefits, and " realistic alternatives discussed. Questions answered and patient/representative(s) expressed understanding.     - Discussed with:  Parent (Mother and/or Father)      - Extended Intubation/Ventilatory Support Discussed: No.      - Patient is DNR/DNI Status: No    Use of blood products discussed: No .     Postoperative Care    Pain management: IV analgesics, Oral pain medications.   PONV prophylaxis: Ondansetron (or other 5HT-3), Dexamethasone or Solumedrol     Comments:    Discussed common and potentially harmful risks for General Anesthesia.   These risks include, but were not limited to: Conversion to secured airway, Sore throat, Airway injury, Dental injury, Aspiration, Respiratory issues (Bronchospasm, Laryngospasm, Desaturation), Hemodynamic issues (Arrhythmia, Hypotension, Ischemia), Potential long term consequences of respiratory and hemodynamic issues, PONV, Emergence delirium/agitation  Risks of invasive procedures were not discussed: N/A    All questions were answered.            Talisha Alvarado MD

## 2021-11-03 NOTE — ANESTHESIA PROCEDURE NOTES
Airway       Patient location during procedure: OR       Procedure Start/Stop Times: 11/3/2021 9:53 AM  Staff -        Anesthesiologist:  Talisha Alvarado MD       CRNA: Irma Dill APRN CRNA       Other Anesthesia Staff: Deanna Casey       Performed By: SRNA  Consent for Airway        Urgency: elective  Indications and Patient Condition       Indications for airway management: claire-procedural       Induction type:intravenous       Mask difficulty assessment: 1 - vent by mask    Final Airway Details       Final airway type: endotracheal airway       Successful airway: ETT - single, Nasal and DENISE  Endotracheal Airway Details        ETT size (mm): 7.0       Cuffed: yes       Successful intubation technique: video laryngoscopy       VL Blade Size: MAC 3       Grade View of Cords: 1       Adjucts: magill forceps       Position: Right       Measured from: nares       Secured at (cm): 26       Bite block used: None    Post intubation assessment        Placement verified by: capnometry, equal breath sounds and chest rise        Number of attempts at approach: 1       Secured with: pink tape       Ease of procedure: easy       Dentition: Intact and Unchanged

## 2021-11-03 NOTE — OP NOTE
OR Procedure Note  It was deemed necessary for this patient to be seen in the hospital operating room because of compromised health due to down syndrome and inability to be seen in a traditional dental clinic.      Consent: Risks complications including but not limited to post-operative pain, swelling, bleeding, infection, temporary/permanent paresthesia/anesthesia of CN V3, lingual nerve, failure to resolve chief complaint. Patient and patient's guardian agrees to procedure as written, and patient signed consent.     Names:  The attending physician was Mark Telles DDS. The assistant dental resident was Jess Ruiz DMD. The assistant dental resident was Paco Herbert DMD. The anesthesiologist was Talisha Alvarado MD. The CRNA was Irma Dill APRN CRNA. The SRNA was Deanna Casey. The circulator was Kristen Miner RN. The Scrub persons were Angélica Silva and Jory Nguyen.    Summary:  Under general anesthesia, the following operations were performed in the mouth: Dental examination, dental radiography (4 bitewings and 7 periapicals), prophy, fluoride varnish.     Diagnosis:  The pre-operative diagnosis was dental caries.    The post-operative diagnosis was plaque induced gingivitis, severe attrition, and trauma induced hyperkeratosis.       General Anesthesia Start:  The patient was brought into the operating room and draped in the usual customary [facility] fashion. Following the time-out procedures, the patient was placed under General Anesthesia Care via Right Naris. A moist throat pack was placed at 10:27.    Observations:  Clinical examination and Radiographic examination revealed no cavities. Maxillary Anteriors reveal severe attrition. Bilateral white lesions on the lateral side of the tongue are present and may be due to trauma caused by grinding of teeth. Most likely diagnosis of hyperkeratosis, to be re-evaluated in 6 months in the dental office.    Probing depths upper right, upper left, lower left was 2-6mm and lower right was 2-5mm.    Procedure:  The following procedures were performed:  Bilateral Dental Examination   Prophy  Fluoride varnish      Throat packed removed at 10:47. The oropharynx was inspected and found to be clear. Estimated blood loss was 2mL. The attending doctor, Dr. Telles was present for the entire procedure.    Dental procedure Finish:  After the dental procedure, the patient was transferred to Adult PACU. The patient s nurse was informed by the dental team about the dental findings and procedures performed.

## 2021-11-03 NOTE — ANESTHESIA POSTPROCEDURE EVALUATION
Patient: William Mckeon    Procedure: Procedure(s):  Bilateral dental exam, Dental radiograph, periodontal therapy, fluoride varnish in the mouth       Diagnosis:Dental caries [K02.9]  Diagnosis Additional Information: No value filed.    Anesthesia Type:  General    Note:  Disposition: Outpatient   Postop Pain Control: Uneventful            Sign Out: Well controlled pain   PONV: No   Neuro/Psych: Uneventful            Sign Out: Acceptable/Baseline neuro status   Airway/Respiratory: Uneventful            Sign Out: Acceptable/Baseline resp. status   CV/Hemodynamics: Uneventful            Sign Out: Acceptable CV status; No obvious hypovolemia; No obvious fluid overload   Other NRE: NONE   DID A NON-ROUTINE EVENT OCCUR? No    Event details/Postop Comments:  Challenging PIV placement.  Ended up having an US placed PIV in the OR with some nitrous.    I personally evaluated the patient at bedside. No anesthesia-related complications noted. Patient is hemodynamically stable with adequate control of pain and nausea. Ready for discharge from PACU. All questions were answered.    Talisha Alvarado MD  Pediatric Anesthesiologist  [phone]           Last vitals:  Vitals Value Taken Time   /66 11/03/21 1200   Temp 36.1  C (97  F) 11/03/21 1101   Pulse 67 11/03/21 1200   Resp 9 11/03/21 1200   SpO2 95 % 11/03/21 1200       Electronically Signed By: Talisha Alvarado MD  November 3, 2021  1:09 PM

## 2021-11-03 NOTE — DISCHARGE INSTRUCTIONS
Same-Day Surgery   Adult Discharge Orders & Instructions     For 24 hours after surgery:  1. Get plenty of rest.  A responsible adult must stay with you for at least 24 hours after you leave the hospital.   2. Pain medication can slow your reflexes. Do not drive or use heavy equipment.  If you have weakness or tingling, don't drive or use heavy equipment until this feeling goes away.  3. Mixing alcohol and pain medication can cause dizziness and slow your breathing. It can even be fatal. Do not drink alcohol while taking pain medication.  4. Avoid strenuous or risky activities.  Ask for help when climbing stairs.   5. You may feel lightheaded.  If so, sit for a few minutes before standing.  Have someone help you get up.   6. If you have nausea (feel sick to your stomach), drink only clear liquids such as apple juice, ginger ale, broth or 7-Up.  Rest may also help.  Be sure to drink enough fluids.  Move to a regular diet as you feel able. Take pain medications with a small amount of solid food, such as toast or crackers, to avoid nausea.   7. A slight fever is normal. Call the doctor if your fever is over 100 F (37.7 C) (taken under the tongue) or lasts longer than 24 hours.  8. You may have a dry mouth, muscle aches, trouble sleeping or a sore throat.  These symptoms should go away after 24 hours.  9. Do not make important or legal decisions.   Pain Management:      1. Take pain medication (if prescribed) for pain as directed by your physician.        2. WARNING: If the pain medication you have been prescribed contains Tylenol  (acetaminophen), DO NOT take additional doses of Tylenol (acetaminophen).     Call your doctor for any of the followin.  Signs of infection (fever, growing tenderness at the surgery site, severe pain, a large amount of drainage or bleeding, foul-smelling drainage, redness, swelling).    2.  It has been over 8 to 10 hours since surgery and you are still not able to urinate (pee).    3.   Headache for over 24 hours.    4.  Numbness, tingling or weakness the day after surgery (if you had spinal anesthesia).  To contact a doctor, call Dr. Telles, Dentistry at [phone] or:      [phone] and ask for the Resident On Call for:          Dentistry (answered 24 hours a day)      Emergency Department:  [facility]: [phone]  [place] [facility]: [phone]               Rev. 10/2014

## 2021-11-03 NOTE — ANESTHESIA CARE TRANSFER NOTE
Patient: William Mckeon    Procedure: Procedure(s):  Bilateral dental exam, Dental radiograph, periodontal therapy, fluoride varnish in the mouth       Diagnosis: Dental caries [K02.9]  Diagnosis Additional Information: No value filed.    Anesthesia Type:   General     Note:    Oropharynx: oropharynx clear of all foreign objects and spontaneously breathing  Level of Consciousness: drowsy  Oxygen Supplementation: face mask  Level of Supplemental Oxygen (L/min / FiO2): 10  Independent Airway: airway patency satisfactory and stable  Dentition: dentition unchanged S/P dental procedure  Vital Signs Stable: post-procedure vital signs reviewed and stable  Report to RN Given: handoff report given  Patient transferred to: PACU  Comments: To PACU with 02, Spont RR. Monitors applied, VSS, PIV/airway patent, Report to RN all questions/concerns answered.     Handoff Report: Identifed the Patient, Identified the Reponsible Provider, Reviewed the pertinent medical history, Discussed the surgical course, Reviewed Intra-OP anesthesia mangement and issues during anesthesia, Set expectations for post-procedure period and Allowed opportunity for questions and acknowledgement of understanding      Vitals:  Vitals Value Taken Time   /69 11/03/21 1101   Temp     Pulse 79 11/03/21 1103   Resp 9 11/03/21 1103   SpO2 94 % 11/03/21 1103   Vitals shown include unvalidated device data.    Electronically Signed By: VALERIE Ashley CRNA  November 3, 2021  11:04 AM

## 2021-11-03 NOTE — BRIEF OP NOTE
[facility]    Brief Operative Note    Pre-operative diagnosis: Dental caries [K02.9]  Post-operative diagnosis Plaque induced gingivitis, Severe attrition, trauma induced hyperkeratosis ( right side and left side tongue)    Procedure: Procedure(s):  Bilateral dental exam, Dental radiograph, periodontal therapy, fluoride varnish in the mouth  Surgeon: Surgeon(s) and Role:     * Mark Telles, DDS - Primary   Jess Sang DMD- secondary  Marwa Bnekarim DMD- secondary  Anesthesia: General   Estimated Blood Loss: Minimal 2 mL    Drains: None  Specimens: * No specimens in log *  Findings:   None.  Complications: None.  Implants: * No implants in log *

## 2021-11-20 ENCOUNTER — HEALTH MAINTENANCE LETTER (OUTPATIENT)
Age: 31
End: 2021-11-20

## 2023-01-07 ENCOUNTER — HEALTH MAINTENANCE LETTER (OUTPATIENT)
Age: 33
End: 2023-01-07

## 2024-02-10 ENCOUNTER — HEALTH MAINTENANCE LETTER (OUTPATIENT)
Age: 34
End: 2024-02-10

## (undated) DEVICE — TUBING SUCTION MEDI-VAC 1/4"X20' N620A

## (undated) DEVICE — SOL NACL 0.9% IRRIG 1000ML BOTTLE 2F7124

## (undated) DEVICE — GLOVE PROTEXIS W/NEU-THERA 6.5  2D73TE65

## (undated) DEVICE — STRAP KNEE/BODY 31143004

## (undated) DEVICE — LABEL MEDICATION SYSTEM 3303-P

## (undated) DEVICE — PREP POVIDONE IODINE SWABSTICKS TRIPLE PACK MDS093902A

## (undated) DEVICE — Device

## (undated) DEVICE — SOL HYDROGEN PEROXIDE 3% 4OZ BOTTLE F0010

## (undated) DEVICE — PREP POVIDONE IODINE SOLUTION 10% 120ML

## (undated) DEVICE — SOL WATER IRRIG 1000ML BOTTLE 2F7114

## (undated) DEVICE — SUCTION TIP YANKAUER W/O VENT K86

## (undated) DEVICE — PREP POVIDONE IODINE SOLUTION 10% 4OZ BOTTLE 29906-004

## (undated) DEVICE — RX BACITRACIN OINTMENT 0.9G 1/32OZ 01680 11109

## (undated) DEVICE — ANTIFOG SOLUTION W/FOAM PAD 31142527

## (undated) DEVICE — LINEN GOWN OVERSIZE 5408

## (undated) DEVICE — SUCTION CANISTER MEDIVAC LINER 1500ML W/LID 65651-515

## (undated) DEVICE — SYR EAR BULB 3OZ 0035830

## (undated) DEVICE — POSITIONER ARMBOARD FOAM 1PAIR LF FP-ARMB1

## (undated) DEVICE — COVER PROBE ULTRASOUND 3D W/GEL 5X96" LF 20-P3D596

## (undated) DEVICE — TOOTHBRUSH ADULT NON STERILE MDS136850

## (undated) DEVICE — GLOVE RADIATION RESISTANT SZ 7  95-394

## (undated) DEVICE — LINEN GOWN X4 5410

## (undated) DEVICE — BASIN SET MAJOR

## (undated) DEVICE — LINEN ORTHO PACK 5446

## (undated) DEVICE — GLOVE PROTEXIS W/NEU-THERA 8.5  2D73TE85

## (undated) DEVICE — ESU GROUND PAD UNIVERSAL W/O CORD

## (undated) DEVICE — BRUSH SURGICAL SCRUB PLAIN STERILE 4454A

## (undated) DEVICE — RX BACITRACIN OINTMENT 0.9G 1/32OZ CUR001109

## (undated) DEVICE — LIGHT HANDLE X2

## (undated) DEVICE — SPONGE RAY-TEC 4X8" 7318

## (undated) DEVICE — PACK SET-UP STD 9102

## (undated) RX ORDER — ROCURONIUM BROMIDE 50 MG/5 ML
SYRINGE (ML) INTRAVENOUS
Status: DISPENSED
Start: 2021-11-03

## (undated) RX ORDER — CHLORHEXIDINE GLUCONATE ORAL RINSE 1.2 MG/ML
SOLUTION DENTAL
Status: DISPENSED
Start: 2021-11-03

## (undated) RX ORDER — CEFAZOLIN SODIUM 2 G/100ML
INJECTION, SOLUTION INTRAVENOUS
Status: DISPENSED
Start: 2017-04-26

## (undated) RX ORDER — FENTANYL CITRATE 50 UG/ML
INJECTION, SOLUTION INTRAMUSCULAR; INTRAVENOUS
Status: DISPENSED
Start: 2021-11-03

## (undated) RX ORDER — LIDOCAINE HYDROCHLORIDE 20 MG/ML
INJECTION, SOLUTION EPIDURAL; INFILTRATION; INTRACAUDAL; PERINEURAL
Status: DISPENSED
Start: 2021-11-03

## (undated) RX ORDER — ACETAMINOPHEN 325 MG/1
TABLET ORAL
Status: DISPENSED
Start: 2021-11-03